# Patient Record
Sex: MALE | Race: WHITE | NOT HISPANIC OR LATINO | URBAN - METROPOLITAN AREA
[De-identification: names, ages, dates, MRNs, and addresses within clinical notes are randomized per-mention and may not be internally consistent; named-entity substitution may affect disease eponyms.]

---

## 2023-12-05 ENCOUNTER — EMERGENCY (EMERGENCY)
Facility: HOSPITAL | Age: 18
LOS: 0 days | Discharge: HOME | End: 2023-12-05
Attending: PEDIATRICS
Payer: COMMERCIAL

## 2023-12-05 VITALS
SYSTOLIC BLOOD PRESSURE: 119 MMHG | HEART RATE: 79 BPM | WEIGHT: 134.48 LBS | DIASTOLIC BLOOD PRESSURE: 70 MMHG | TEMPERATURE: 99 F | OXYGEN SATURATION: 99 % | RESPIRATION RATE: 19 BRPM

## 2023-12-05 DIAGNOSIS — S61.412A LACERATION WITHOUT FOREIGN BODY OF LEFT HAND, INITIAL ENCOUNTER: ICD-10-CM

## 2023-12-05 DIAGNOSIS — S61.012A LACERATION WITHOUT FOREIGN BODY OF LEFT THUMB WITHOUT DAMAGE TO NAIL, INITIAL ENCOUNTER: ICD-10-CM

## 2023-12-05 DIAGNOSIS — Z23 ENCOUNTER FOR IMMUNIZATION: ICD-10-CM
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
## 2023-12-05 PROCEDURE — 99283 EMERGENCY DEPT VISIT LOW MDM: CPT | Mod: 25

## 2023-12-05 PROCEDURE — 12001 RPR S/N/AX/GEN/TRNK 2.5CM/<: CPT | Mod: XU

## 2023-12-05 PROCEDURE — 29130 APPL FINGER SPLINT STATIC: CPT | Mod: LT

## 2023-12-05 PROCEDURE — 12001 RPR S/N/AX/GEN/TRNK 2.5CM/<: CPT

## 2023-12-05 PROCEDURE — 99284 EMERGENCY DEPT VISIT MOD MDM: CPT | Mod: 25

## 2023-12-05 PROCEDURE — 90715 TDAP VACCINE 7 YRS/> IM: CPT

## 2023-12-05 PROCEDURE — 90471 IMMUNIZATION ADMIN: CPT

## 2023-12-05 RX ORDER — CEPHALEXIN 500 MG
1 CAPSULE ORAL
Qty: 21 | Refills: 0
Start: 2023-12-05 | End: 2023-12-11

## 2023-12-05 RX ORDER — CEPHALEXIN 500 MG
1000 CAPSULE ORAL ONCE
Refills: 0 | Status: COMPLETED | OUTPATIENT
Start: 2023-12-05 | End: 2023-12-05

## 2023-12-05 RX ORDER — TETANUS TOXOID, REDUCED DIPHTHERIA TOXOID AND ACELLULAR PERTUSSIS VACCINE, ADSORBED 5; 2.5; 8; 8; 2.5 [IU]/.5ML; [IU]/.5ML; UG/.5ML; UG/.5ML; UG/.5ML
0.5 SUSPENSION INTRAMUSCULAR ONCE
Refills: 0 | Status: COMPLETED | OUTPATIENT
Start: 2023-12-05 | End: 2023-12-05

## 2023-12-05 RX ADMIN — TETANUS TOXOID, REDUCED DIPHTHERIA TOXOID AND ACELLULAR PERTUSSIS VACCINE, ADSORBED 0.5 MILLILITER(S): 5; 2.5; 8; 8; 2.5 SUSPENSION INTRAMUSCULAR at 20:07

## 2023-12-05 RX ADMIN — Medication 1000 MILLIGRAM(S): at 20:06

## 2023-12-05 NOTE — ED PROVIDER NOTE - PROGRESS NOTE DETAILS
explored wound in bloodless field. muscle exposure without tendon exposure or lac noted within wound. no active bleeding. within limited extension/abduction of left thumb, concern for tendon injury. lac repair and splint applied/ f/u hand surgery as outpatient

## 2023-12-05 NOTE — ED PEDIATRIC NURSE NOTE - OBJECTIVE STATEMENT
pt presents while opening fenr lights with a knife stabbed left thumb pt presents while opening fern lights with a knife stabbed left thumb

## 2023-12-05 NOTE — ED PROVIDER NOTE - PHYSICAL EXAMINATION
CONSTITUTIONAL: Well-appearing; in no apparent distress.   EYES: PERRL; EOM intact.   MS: left thumb with limited full abduction/extension. flexion and adduction normal. sensation/cap refill nml to left thumb tip. left thumb nv intact.   SKIN: 1.5 cm linear laceration to dorsum of left thumb base over the MCP joint. minimum bleeding   NEURO/PSYCH: A & O x 4; grossly unremarkable.

## 2023-12-05 NOTE — ED PROVIDER NOTE - CARE PROVIDERS DIRECT ADDRESSES
[email or web address]

## 2023-12-05 NOTE — ED PROVIDER NOTE - CARE PLAN
1 Principal Discharge DX:	Laceration of left hand, complicated  Secondary Diagnosis:	Injury of tendon of hand

## 2023-12-05 NOTE — ED PROVIDER NOTE - PATIENT PORTAL LINK FT
You can access the FollowMyHealth Patient Portal offered by [facility] by registering at the following website: [email or web address]. By joining [employer]’s FollowMyHealth portal, you will also be able to view your health information using other applications (apps) compatible with our system. You can access the FollowMyHealth Patient Portal offered by [facility] by registering at the following website: [email or web address]. By joining [employer]’s FollowMyHealth portal, you will also be able to view your health information using other applications (apps) compatible with our system.

## 2023-12-05 NOTE — ED PROVIDER NOTE - CARE PROVIDER_API CALL
Suha Hunter  Plastic Surgery  [address] [place] [address], [address]  [place] [address]  Phone: [phone]  Fax: [phone]  Follow Up Time:    Suha Hunter  Plastic Surgery  [address] [place] [address], [address]  [place] [address]  Phone: [phone]  Fax: [phone]  Follow Up Time:

## 2023-12-05 NOTE — ED PROVIDER NOTE - CLINICAL SUMMARY MEDICAL DECISION MAKING FREE TEXT BOX
18 yr old right handed male presents to the ED with his mother for evaluation of accidental injury to left thumb with a .  Unsure of tetanus status but will follow up with PMD in the morning.  Denies numbness or tingling.     Physical Exam: VS reviewed. Pt is well appearing, in no respiratory distress. MMM. Cap refill <2 seconds. Skin with no obvious rash noted.  + 1.5 linear laceration over the base of the left thumb.  Able to flex and extend left thumb, mild limitation with extension.  No active bleeding during my exam.  No obvious tendon injury.  Chest with no retractions, no distress. Neuro exam grossly intact.  Plan: Laceration repair, splint, follow up with ortho advised for evaluation of possible tendon injury.

## 2023-12-05 NOTE — ED PROVIDER NOTE - OBJECTIVE STATEMENT
17 yo right handed male no sig hx present c/o laceration of left hand while he was cutting with a  1 hour PTA. compression improved the bleeding.  movement worsen bleeding and pain. denies weakness and numbness to the injured extremity. last Tetanus ? 8 years ago 19 yo right handed male no sig hx present c/o laceration of left hand while he was cutting with a  1 hour PTA. compression improved the bleeding.  movement worsen bleeding and pain. denies weakness and numbness to the injured extremity. last Tetanus ? 8 years ago

## 2023-12-05 NOTE — ED PROVIDER NOTE - NS ED ATTENDING STATEMENT MOD
This was a shared visit with the ALETHEA. I reviewed and verified the documentation and independently performed the documented:

## 2023-12-05 NOTE — ED PROVIDER NOTE - ATTENDING APP SHARED VISIT CONTRIBUTION OF CARE
I personally evaluated the patient. I reviewed the Resident’s or Physician Assistant’s note (as assigned above), and agree with the findings and plan except as documented in my note. I personally evaluated the patient. I reviewed the Resident’s or Physician Assistant’s note (as assigned above), and agree with the findings and plan except as documented in my note. 18 yr old right handed male presents to the ED with his mother for evaluation of accidental injury to left thumb with a .  Unsure of tetanus status but will follow up with PMD in the morning.  Denies numbness or tingling.     Physical Exam: VS reviewed. Pt is well appearing, in no respiratory distress. MMM. Cap refill <2 seconds. Skin with no obvious rash noted.  + 1.5 linear laceration over the base of the left thumb.  Able to flex and extend left thumb, mild limitation with extension.  No active bleeding during my exam.  No obvious tendon injury.  Chest with no retractions, no distress. Neuro exam grossly intact.  Plan: Laceration repair, splint, follow up with ortho advised for evaluation of possible tendon injury.

## 2023-12-05 NOTE — ED PROVIDER NOTE - NSFOLLOWUPINSTRUCTIONS_ED_ALL_ED_FT
Our Emergency Department Referral Coordinators will be reaching out to you in the next 24-48 hours from 9:00am to 5:00pm with a follow up appointment. Please expect a phone call from the hospital in that time frame. If you do not receive a call or if you have any questions or concerns, you can reach them at   [phone] for Hand surgery     Tendon injury of left hand    There's suspected tendon injury (limited full extension of left thumb), Please continue to wear the splint until follow up with Hand surgery.       Laceration    Sutures were absorbable and no sutures removal will be required. You may go to local urgent care or return to ED if the sutures were intact for more than 2 weeks and started irritating the skin.     WHAT YOU NEED TO KNOW:    A laceration is an injury to the skin and the soft tissue underneath it. Lacerations happen when you are cut or hit by something. They can happen anywhere on the body.     DISCHARGE INSTRUCTIONS:    Return to the emergency department if:   You have heavy bleeding or bleeding that does not stop after 10 minutes of holding firm, direct pressure over the wound.   Your wound opens up.     Contact your healthcare provider if:   You have a fever or chills.   Your laceration is red, warm, or swollen.  You have red streaks on your skin coming from your wound.  You have white or yellow drainage from the wound that smells bad.  You have pain that gets worse, even after treatment.   You have questions or concerns about your condition or care.     Medicines:   Prescription pain medicine may be given. Ask how to take this medicine safely.   Antibiotics help treat or prevent a bacterial infection.     Take your medicine as directed. Contact your healthcare provider if you think your medicine is not helping or if you have side effects. Tell him or her if you are allergic to any medicine. Keep a list of the medicines, vitamins, and herbs you take. Include the amounts, and when and why you take them. Bring the list or the pill bottles to follow-up visits. Carry your medicine list with you in case of an emergency.    Care for your wound as directed:     Do not get your wound wet until your healthcare provider says it is okay. Do not soak your wound in water. Do not go swimming until your healthcare provider says it is okay. Carefully wash the wound with soap and water. Gently pat the area dry or allow it to air dry.     Change your bandages when they get wet, dirty, or after washing. Apply new, clean bandages as directed. Do not apply elastic bandages or tape too tight. Do not put powders or lotions over your incision.     Apply antibiotic ointment as directed. Your healthcare provider may give you antibiotic ointment to put over your wound if you have stitches. If you have strips of tape over your incision, let them dry up and fall off on their own. If they do not fall off within 14 days, gently remove them. If you have glue over your wound, do not remove or pick at it. If your glue comes off, do not replace it with glue that you have at home.       Check your wound every day for signs of infection such as swelling, redness, or pus.     Self-care:     Apply ice on your wound for 15 to 20 minutes every hour or as directed. Use an ice pack, or put crushed ice in a plastic bag. Cover it with a towel. Ice helps prevent tissue damage and decreases swelling and pain.    Use a splint as directed. A splint will decrease movement and stress on your wound. It may help it heal faster. A splint may be used for lacerations over joints or areas of your body that bend. Ask your healthcare provider how to apply and remove a splint.     Decrease scarring of your wound by applying ointments as directed. Do not apply ointments until your healthcare provider says it is okay. You may need to wait until your wound is healed. Ask which ointment to buy and how often to use it. After your wound is healed, use sunscreen over the area when you are out in the sun. You should do this for at least 6 months to 1 year after your injury.     Follow up with your healthcare provider as directed: You may need to follow up in 24 to 48 hours to have your wound checked for infection. You will need to return in 3 to 14 days if you have stitches or staples so they can be removed. Care for your wound as directed to prevent infection and help it heal. Write down your questions so you remember to ask them during your visits. Our Emergency Department Referral Coordinators will be reaching out to you in the next 24-48 hours from 9:00am to 5:00pm with a follow up appointment. Please expect a phone call from the hospital in that time frame. If you do not receive a call or if you have any questions or concerns, you can reach them at   [phone] for Hand surgery     Tendon injury of left hand    There's suspected tendon injury (limited full extension of left thumb), Please continue to wear the splint until follow up with Hand surgery.       Laceration    Sutures were absorbable and no sutures removal will be required. You may go to local urgent care or return to ED if the sutures were intact for more than 2 weeks and started irritating the skin.     WHAT YOU NEED TO KNOW:    A laceration is an injury to the skin and the soft tissue underneath it. Lacerations happen when you are cut or hit by something. They can happen anywhere on the body.     DISCHARGE INSTRUCTIONS:    Return to the emergency department if:   You have heavy bleeding or bleeding that does not stop after 10 minutes of holding firm, direct pressure over the wound.   Your wound opens up.     Contact your healthcare provider if:   You have a fever or chills.   Your laceration is red, warm, or swollen.  You have red streaks on your skin coming from your wound.  You have white or yellow drainage from the wound that smells bad.  You have pain that gets worse, even after treatment.   You have questions or concerns about your condition or care.     Medicines:   Prescription pain medicine may be given. Ask how to take this medicine safely.   Antibiotics help treat or prevent a bacterial infection.     Take your medicine as directed. Contact your healthcare provider if you think your medicine is not helping or if you have side effects. Tell him or her if you are allergic to any medicine. Keep a list of the medicines, vitamins, and herbs you take. Include the amounts, and when and why you take them. Bring the list or the pill bottles to follow-up visits. Carry your medicine list with you in case of an emergency.    Care for your wound as directed:     Do not get your wound wet until your healthcare provider says it is okay. Do not soak your wound in water. Do not go swimming until your healthcare provider says it is okay. Carefully wash the wound with soap and water. Gently pat the area dry or allow it to air dry.     Change your bandages when they get wet, dirty, or after washing. Apply new, clean bandages as directed. Do not apply elastic bandages or tape too tight. Do not put powders or lotions over your incision.     Apply antibiotic ointment as directed. Your healthcare provider may give you antibiotic ointment to put over your wound if you have stitches. If you have strips of tape over your incision, let them dry up and fall off on their own. If they do not fall off within 14 days, gently remove them. If you have glue over your wound, do not remove or pick at it. If your glue comes off, do not replace it with glue that you have at home.       Check your wound every day for signs of infection such as swelling, redness, or pus.     Self-care:     Apply ice on your wound for 15 to 20 minutes every hour or as directed. Use an ice pack, or put crushed ice in a plastic bag. Cover it with a towel. Ice helps prevent tissue damage and decreases swelling and pain.    Use a splint as directed. A splint will decrease movement and stress on your wound. It may help it heal faster. A splint may be used for lacerations over joints or areas of your body that bend. Ask your healthcare provider how to apply and remove a splint.     Decrease scarring of your wound by applying ointments as directed. Do not apply ointments until your healthcare provider says it is okay. You may need to wait until your wound is healed. Ask which ointment to buy and how often to use it. After your wound is healed, use sunscreen over the area when you are out in the sun. You should do this for at least 6 months to 1 year after your injury.     Follow up with your healthcare provider as directed: You may need to follow up in 24 to 48 hours to have your wound checked for infection. You will need to return in 3 to 14 days if you have stitches or staples so they can be removed. Care for your wound as directed to prevent infection and help it heal. Write down your questions so you remember to ask them during your visits.

## 2023-12-07 NOTE — ED PROCEDURE NOTE - NS ED ATTENDING STATEMENT MOD
This was a shared visit with the ALETHEA. I reviewed and verified the documentation and independently performed the documented:
This was a shared visit with the ALETHEA. I reviewed and verified the documentation and independently performed the documented:

## 2023-12-08 ENCOUNTER — APPOINTMENT (OUTPATIENT)
Dept: PLASTIC SURGERY | Facility: CLINIC | Age: 18
End: 2023-12-08
Payer: COMMERCIAL

## 2023-12-08 VITALS — WEIGHT: 130 LBS | HEIGHT: 71 IN | BODY MASS INDEX: 18.2 KG/M2

## 2023-12-08 DIAGNOSIS — S61.012A LACERATION W/OUT FOREIGN BODY OF LEFT THUMB W/OUT DAMAGE TO NAIL, INITIAL ENCOUNTER: ICD-10-CM

## 2023-12-08 PROBLEM — Z00.00 ENCOUNTER FOR PREVENTIVE HEALTH EXAMINATION: Status: ACTIVE | Noted: 2023-12-08

## 2023-12-08 PROBLEM — Z78.9 OTHER SPECIFIED HEALTH STATUS: Chronic | Status: ACTIVE | Noted: 2023-12-06

## 2023-12-08 PROCEDURE — 99203 OFFICE O/P NEW LOW 30 MIN: CPT

## 2023-12-08 RX ORDER — CEPHALEXIN 750 MG/1
CAPSULE ORAL
Refills: 0 | Status: ACTIVE | COMMUNITY

## 2023-12-18 ENCOUNTER — APPOINTMENT (OUTPATIENT)
Dept: PLASTIC SURGERY | Facility: CLINIC | Age: 18
End: 2023-12-18
Payer: COMMERCIAL

## 2023-12-18 DIAGNOSIS — S61.012D LACERATION W/OUT FOREIGN BODY OF LEFT THUMB W/OUT DAMAGE TO NAIL, SUBSEQUENT ENCOUNTER: ICD-10-CM

## 2023-12-18 PROCEDURE — 99213 OFFICE O/P EST LOW 20 MIN: CPT

## 2023-12-18 NOTE — ASSESSMENT
[FreeTextEntry1] : 18 year old male who presents for f/u on left thumb lac sustained 2 weeks ago  - No surgical intervention at this time - Pt aware the chromic sutures will slowly dissolve on their own - Pt advised to continue to monitor ROM and strength in digit. Pt thinks he is just apprehensive to use digit due to incisional discomfort. Advised pt to monitor for the next month ([place]aving for [place] next week), and follow up or call once he is back, can send script for hand OT at that time - Scar management reviewed, ok to start in 2 weeks - Limit activity another 2 weeks - f/u as needed

## 2023-12-18 NOTE — HISTORY OF PRESENT ILLNESS
[FreeTextEntry1] : 17 y/o RHD healthy male with no sig hx present c/o laceration of left hand while he was cutting with a  on 12/5/23. Had simple lac repair and was discharged on abx and referred here for evaluation of possible tendon injury. Pt presents today with his mom present. Denies fever/chills or significant pain. Had in splint. Taking abx as avdvised.   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 Has been using Aquaphor daily to area. Has not been back at fencing, limting activity. Denies fever/chills/drainage. 
(3) occasionally moist

## 2023-12-18 NOTE — PHYSICAL EXAM
[de-identified] : Well developed, well nourished in NAD  [de-identified] : Atraumatic, normocephalic  [de-identified] : HASMUKHs [de-identified] : Normal ears, normal nose and normal lips  [de-identified] : non labored [de-identified] : Left hand is warm and well perfused with capp refill < 2 secs. Left dorsal radial thumb with 1.3cm vertically oriented incision, skin edges well approximated with no open areas, chromics still in place. SILT in u/r/m distrubutions. FPL/FPB intact, good ad/ab-duction. NO evidence of tendon injury